# Patient Record
Sex: FEMALE | Race: WHITE | NOT HISPANIC OR LATINO | Employment: OTHER | URBAN - METROPOLITAN AREA
[De-identification: names, ages, dates, MRNs, and addresses within clinical notes are randomized per-mention and may not be internally consistent; named-entity substitution may affect disease eponyms.]

---

## 2017-01-01 ENCOUNTER — TELEPHONE (OUTPATIENT)
Dept: FAMILY MEDICINE | Facility: CLINIC | Age: 72
End: 2017-01-01

## 2017-01-01 ENCOUNTER — LAB VISIT (OUTPATIENT)
Dept: LAB | Facility: HOSPITAL | Age: 72
End: 2017-01-01
Attending: FAMILY MEDICINE
Payer: MEDICARE

## 2017-01-01 ENCOUNTER — OFFICE VISIT (OUTPATIENT)
Dept: CARDIOLOGY | Facility: CLINIC | Age: 72
End: 2017-01-01
Payer: MEDICARE

## 2017-01-01 ENCOUNTER — OFFICE VISIT (OUTPATIENT)
Dept: FAMILY MEDICINE | Facility: CLINIC | Age: 72
End: 2017-01-01
Payer: MEDICARE

## 2017-01-01 ENCOUNTER — TELEPHONE (OUTPATIENT)
Dept: CARDIOLOGY | Facility: CLINIC | Age: 72
End: 2017-01-01

## 2017-01-01 ENCOUNTER — TELEPHONE (OUTPATIENT)
Dept: PHARMACY | Facility: CLINIC | Age: 72
End: 2017-01-01

## 2017-01-01 ENCOUNTER — PATIENT OUTREACH (OUTPATIENT)
Dept: ADMINISTRATIVE | Facility: HOSPITAL | Age: 72
End: 2017-01-01

## 2017-01-01 ENCOUNTER — OFFICE VISIT (OUTPATIENT)
Dept: PRIMARY CARE CLINIC | Facility: CLINIC | Age: 72
End: 2017-01-01
Payer: MEDICARE

## 2017-01-01 ENCOUNTER — CLINICAL SUPPORT (OUTPATIENT)
Dept: CARDIOLOGY | Facility: CLINIC | Age: 72
End: 2017-01-01
Payer: MEDICARE

## 2017-01-01 VITALS
SYSTOLIC BLOOD PRESSURE: 94 MMHG | RESPIRATION RATE: 16 BRPM | HEIGHT: 65 IN | TEMPERATURE: 98 F | DIASTOLIC BLOOD PRESSURE: 64 MMHG | WEIGHT: 139.13 LBS | OXYGEN SATURATION: 97 % | BODY MASS INDEX: 23.18 KG/M2 | HEART RATE: 96 BPM

## 2017-01-01 VITALS
BODY MASS INDEX: 23.36 KG/M2 | TEMPERATURE: 99 F | DIASTOLIC BLOOD PRESSURE: 68 MMHG | HEIGHT: 65 IN | WEIGHT: 140.19 LBS | HEART RATE: 80 BPM | SYSTOLIC BLOOD PRESSURE: 100 MMHG

## 2017-01-01 VITALS
HEART RATE: 90 BPM | SYSTOLIC BLOOD PRESSURE: 106 MMHG | HEIGHT: 65 IN | OXYGEN SATURATION: 96 % | BODY MASS INDEX: 22.66 KG/M2 | WEIGHT: 136 LBS | DIASTOLIC BLOOD PRESSURE: 66 MMHG

## 2017-01-01 VITALS
DIASTOLIC BLOOD PRESSURE: 56 MMHG | BODY MASS INDEX: 24.91 KG/M2 | WEIGHT: 149.5 LBS | HEART RATE: 88 BPM | HEIGHT: 65 IN | SYSTOLIC BLOOD PRESSURE: 94 MMHG

## 2017-01-01 VITALS
HEART RATE: 91 BPM | BODY MASS INDEX: 23.88 KG/M2 | WEIGHT: 143.31 LBS | DIASTOLIC BLOOD PRESSURE: 93 MMHG | HEIGHT: 65 IN | SYSTOLIC BLOOD PRESSURE: 192 MMHG

## 2017-01-01 DIAGNOSIS — I70.0 THORACIC AORTA ATHEROSCLEROSIS: ICD-10-CM

## 2017-01-01 DIAGNOSIS — Z12.31 ENCOUNTER FOR SCREENING MAMMOGRAM FOR MALIGNANT NEOPLASM OF BREAST: ICD-10-CM

## 2017-01-01 DIAGNOSIS — I10 ESSENTIAL HYPERTENSION: ICD-10-CM

## 2017-01-01 DIAGNOSIS — Z72.0 TOBACCO ABUSE: ICD-10-CM

## 2017-01-01 DIAGNOSIS — I48.91 ATRIAL FIBRILLATION, UNSPECIFIED TYPE: Primary | ICD-10-CM

## 2017-01-01 DIAGNOSIS — Z13.6 SCREENING FOR HYPERTENSION: Primary | ICD-10-CM

## 2017-01-01 DIAGNOSIS — R10.30 LOWER ABDOMINAL PAIN: ICD-10-CM

## 2017-01-01 DIAGNOSIS — R06.2 WHEEZING: Primary | ICD-10-CM

## 2017-01-01 DIAGNOSIS — F10.10 ALCOHOL ABUSE: ICD-10-CM

## 2017-01-01 DIAGNOSIS — Z78.0 ASYMPTOMATIC POSTMENOPAUSAL STATUS (AGE-RELATED) (NATURAL): Primary | ICD-10-CM

## 2017-01-01 DIAGNOSIS — I48.91 ATRIAL FIBRILLATION, UNSPECIFIED TYPE: ICD-10-CM

## 2017-01-01 DIAGNOSIS — Z13.6 SCREENING FOR HYPERTENSION: ICD-10-CM

## 2017-01-01 DIAGNOSIS — Z87.81 S/P LEFT HIP FRACTURE: ICD-10-CM

## 2017-01-01 DIAGNOSIS — Z00.00 PREVENTATIVE HEALTH CARE: Primary | ICD-10-CM

## 2017-01-01 DIAGNOSIS — I48.0 PAROXYSMAL ATRIAL FIBRILLATION: Primary | ICD-10-CM

## 2017-01-01 DIAGNOSIS — J44.1 COPD EXACERBATION: Primary | ICD-10-CM

## 2017-01-01 DIAGNOSIS — J44.9 CHRONIC OBSTRUCTIVE PULMONARY DISEASE, UNSPECIFIED COPD TYPE: ICD-10-CM

## 2017-01-01 DIAGNOSIS — J44.89 ASTHMA-CHRONIC OBSTRUCTIVE PULMONARY DISEASE OVERLAP SYNDROME: ICD-10-CM

## 2017-01-01 DIAGNOSIS — R05.9 COUGH: ICD-10-CM

## 2017-01-01 LAB
BILIRUB UR QL STRIP: NEGATIVE
CHOLEST SERPL-MCNC: 208 MG/DL
CHOLEST/HDLC SERPL: 3.1 {RATIO}
CLARITY UR: CLEAR
COLOR UR: YELLOW
GLUCOSE UR QL STRIP: NEGATIVE
HDLC SERPL-MCNC: 68 MG/DL
HDLC SERPL: 32.7 %
HGB UR QL STRIP: ABNORMAL
KETONES UR QL STRIP: NEGATIVE
LDLC SERPL CALC-MCNC: 119.4 MG/DL
LEUKOCYTE ESTERASE UR QL STRIP: NEGATIVE
NITRITE UR QL STRIP: NEGATIVE
NONHDLC SERPL-MCNC: 140 MG/DL
PH UR STRIP: 6 [PH] (ref 5–8)
PROT UR QL STRIP: NEGATIVE
SP GR UR STRIP: >=1.03 (ref 1–1.03)
TRIGL SERPL-MCNC: 103 MG/DL
URN SPEC COLLECT METH UR: ABNORMAL

## 2017-01-01 PROCEDURE — 99999 PR PBB SHADOW E&M-EST. PATIENT-LVL II: CPT | Mod: PBBFAC,,, | Performed by: INTERNAL MEDICINE

## 2017-01-01 PROCEDURE — 3078F DIAST BP <80 MM HG: CPT | Mod: S$GLB,,, | Performed by: NURSE PRACTITIONER

## 2017-01-01 PROCEDURE — 1159F MED LIST DOCD IN RCRD: CPT | Mod: S$GLB,,, | Performed by: INTERNAL MEDICINE

## 2017-01-01 PROCEDURE — 1125F AMNT PAIN NOTED PAIN PRSNT: CPT | Mod: S$GLB,,, | Performed by: NURSE PRACTITIONER

## 2017-01-01 PROCEDURE — 99499 UNLISTED E&M SERVICE: CPT | Mod: S$GLB,,, | Performed by: INTERNAL MEDICINE

## 2017-01-01 PROCEDURE — 99204 OFFICE O/P NEW MOD 45 MIN: CPT | Mod: S$GLB,,, | Performed by: INTERNAL MEDICINE

## 2017-01-01 PROCEDURE — 80061 LIPID PANEL: CPT

## 2017-01-01 PROCEDURE — 99214 OFFICE O/P EST MOD 30 MIN: CPT | Mod: S$GLB,,, | Performed by: NURSE PRACTITIONER

## 2017-01-01 PROCEDURE — 1160F RVW MEDS BY RX/DR IN RCRD: CPT | Mod: S$GLB,,, | Performed by: NURSE PRACTITIONER

## 2017-01-01 PROCEDURE — 99499 UNLISTED E&M SERVICE: CPT | Mod: S$GLB,,, | Performed by: NURSE PRACTITIONER

## 2017-01-01 PROCEDURE — 99214 OFFICE O/P EST MOD 30 MIN: CPT | Mod: S$GLB,,, | Performed by: INTERNAL MEDICINE

## 2017-01-01 PROCEDURE — 3008F BODY MASS INDEX DOCD: CPT | Mod: S$GLB,,, | Performed by: NURSE PRACTITIONER

## 2017-01-01 PROCEDURE — 81003 URINALYSIS AUTO W/O SCOPE: CPT | Mod: PO

## 2017-01-01 PROCEDURE — 99999 PR PBB SHADOW E&M-EST. PATIENT-LVL IV: CPT | Mod: PBBFAC,,, | Performed by: NURSE PRACTITIONER

## 2017-01-01 PROCEDURE — 99204 OFFICE O/P NEW MOD 45 MIN: CPT | Mod: S$GLB,,, | Performed by: NURSE PRACTITIONER

## 2017-01-01 PROCEDURE — G0009 ADMIN PNEUMOCOCCAL VACCINE: HCPCS | Mod: S$GLB,,, | Performed by: NURSE PRACTITIONER

## 2017-01-01 PROCEDURE — 99999 PR PBB SHADOW E&M-EST. PATIENT-LVL V: CPT | Mod: PBBFAC,,, | Performed by: NURSE PRACTITIONER

## 2017-01-01 PROCEDURE — 1159F MED LIST DOCD IN RCRD: CPT | Mod: S$GLB,,, | Performed by: NURSE PRACTITIONER

## 2017-01-01 PROCEDURE — 99213 OFFICE O/P EST LOW 20 MIN: CPT | Mod: S$GLB,,, | Performed by: NURSE PRACTITIONER

## 2017-01-01 PROCEDURE — 36415 COLL VENOUS BLD VENIPUNCTURE: CPT | Mod: PO

## 2017-01-01 PROCEDURE — 90670 PCV13 VACCINE IM: CPT | Mod: S$GLB,,, | Performed by: NURSE PRACTITIONER

## 2017-01-01 PROCEDURE — 93224 XTRNL ECG REC UP TO 48 HRS: CPT | Mod: S$GLB,,, | Performed by: INTERNAL MEDICINE

## 2017-01-01 PROCEDURE — 3074F SYST BP LT 130 MM HG: CPT | Mod: S$GLB,,, | Performed by: NURSE PRACTITIONER

## 2017-01-01 PROCEDURE — 1126F AMNT PAIN NOTED NONE PRSNT: CPT | Mod: S$GLB,,, | Performed by: INTERNAL MEDICINE

## 2017-01-01 RX ORDER — VALSARTAN 320 MG/1
TABLET ORAL
OUTPATIENT
Start: 2017-01-01

## 2017-01-01 RX ORDER — DRONEDARONE 400 MG/1
TABLET, FILM COATED ORAL
COMMUNITY
Start: 2017-01-01 | End: 2017-01-01 | Stop reason: ALTCHOICE

## 2017-01-01 RX ORDER — TIOTROPIUM BROMIDE 18 UG/1
CAPSULE ORAL; RESPIRATORY (INHALATION)
Qty: 30 CAPSULE | Refills: 11 | Status: SHIPPED | OUTPATIENT
Start: 2017-01-01

## 2017-01-01 RX ORDER — DOXYCYCLINE 100 MG/1
100 CAPSULE ORAL 2 TIMES DAILY
Qty: 20 CAPSULE | Refills: 0 | Status: SHIPPED | OUTPATIENT
Start: 2017-01-01 | End: 2017-01-01

## 2017-01-01 RX ORDER — FLUTICASONE PROPIONATE 50 MCG
2 SPRAY, SUSPENSION (ML) NASAL DAILY
Qty: 16 G | Refills: 11 | Status: SHIPPED | OUTPATIENT
Start: 2017-01-01

## 2017-01-01 RX ORDER — ALBUTEROL SULFATE 90 UG/1
2 AEROSOL, METERED RESPIRATORY (INHALATION) EVERY 6 HOURS PRN
Qty: 18 G | Refills: 3 | Status: SHIPPED | OUTPATIENT
Start: 2017-01-01

## 2017-01-01 RX ORDER — IPRATROPIUM BROMIDE 0.5 MG/2.5ML
500 SOLUTION RESPIRATORY (INHALATION) 4 TIMES DAILY
Status: ON HOLD | COMMUNITY
End: 2017-01-01 | Stop reason: HOSPADM

## 2017-01-01 RX ORDER — PREDNISONE 20 MG/1
TABLET ORAL
Qty: 20 TABLET | Refills: 0 | Status: SHIPPED | OUTPATIENT
Start: 2017-01-01 | End: 2017-01-01

## 2017-01-01 RX ORDER — METHYLPREDNISOLONE 4 MG/1
TABLET ORAL
COMMUNITY
Start: 2017-01-01 | End: 2017-01-01

## 2017-01-01 RX ORDER — VALSARTAN 320 MG/1
TABLET ORAL
COMMUNITY
Start: 2017-01-01 | End: 2017-01-01 | Stop reason: SDUPTHER

## 2017-01-01 RX ORDER — OMEPRAZOLE 20 MG/1
20 CAPSULE, DELAYED RELEASE ORAL DAILY
COMMUNITY
End: 2017-01-01 | Stop reason: CLARIF

## 2017-01-01 RX ORDER — PREDNISONE 10 MG/1
TABLET ORAL
Qty: 20 TABLET | Refills: 0 | Status: SHIPPED | OUTPATIENT
Start: 2017-01-01 | End: 2017-01-01

## 2017-01-01 RX ORDER — ALBUTEROL SULFATE 0.83 MG/ML
SOLUTION RESPIRATORY (INHALATION)
Qty: 300 ML | Refills: 0 | Status: SHIPPED | OUTPATIENT
Start: 2017-01-01 | End: 2017-01-01 | Stop reason: SDUPTHER

## 2017-01-01 RX ORDER — HYDROCODONE BITARTRATE AND ACETAMINOPHEN 5; 325 MG/1; MG/1
1 TABLET ORAL EVERY 6 HOURS PRN
OUTPATIENT
Start: 2017-01-01

## 2017-01-01 RX ORDER — VALSARTAN 320 MG/1
320 TABLET ORAL DAILY
Qty: 30 TABLET | Refills: 5 | Status: SHIPPED | OUTPATIENT
Start: 2017-01-01 | End: 2017-01-01 | Stop reason: CLARIF

## 2017-01-01 RX ORDER — LIDOCAINE AND PRILOCAINE 25; 25 MG/G; MG/G
CREAM TOPICAL
COMMUNITY
Start: 2017-01-01 | End: 2017-01-01

## 2017-01-01 RX ORDER — ALBUTEROL SULFATE 0.83 MG/ML
SOLUTION RESPIRATORY (INHALATION)
Qty: 300 ML | Refills: 0 | Status: SHIPPED | OUTPATIENT
Start: 2017-01-01 | End: 2018-01-01 | Stop reason: SDUPTHER

## 2017-01-01 RX ORDER — FERROUS SULFATE 220 (44)/5
220 SOLUTION, ORAL ORAL DAILY
COMMUNITY
End: 2017-01-01

## 2017-01-01 RX ORDER — VALSARTAN AND HYDROCHLOROTHIAZIDE 320; 12.5 MG/1; MG/1
1 TABLET, FILM COATED ORAL DAILY
COMMUNITY
End: 2017-01-01

## 2017-01-01 RX ORDER — PNV NO.95/FERROUS FUM/FOLIC AC 28MG-0.8MG
100 TABLET ORAL DAILY
COMMUNITY

## 2017-01-01 RX ORDER — AZITHROMYCIN 250 MG/1
TABLET, FILM COATED ORAL
COMMUNITY
Start: 2017-01-01 | End: 2017-01-01 | Stop reason: ALTCHOICE

## 2017-01-01 RX ORDER — VALSARTAN AND HYDROCHLOROTHIAZIDE 160; 12.5 MG/1; MG/1
1 TABLET, FILM COATED ORAL DAILY
Qty: 90 TABLET | Refills: 3 | Status: ON HOLD | OUTPATIENT
Start: 2017-01-01 | End: 2018-01-01 | Stop reason: HOSPADM

## 2017-01-01 RX ORDER — PREDNISONE 10 MG/1
TABLET ORAL
COMMUNITY
Start: 2017-01-01 | End: 2017-01-01

## 2017-01-01 RX ORDER — AZELASTINE 1 MG/ML
1 SPRAY, METERED NASAL 2 TIMES DAILY
COMMUNITY

## 2017-01-01 RX ORDER — IBUPROFEN 200 MG
200 TABLET ORAL EVERY 6 HOURS PRN
Status: ON HOLD | COMMUNITY
End: 2018-01-01 | Stop reason: HOSPADM

## 2017-01-04 ENCOUNTER — OFFICE VISIT (OUTPATIENT)
Dept: PRIMARY CARE CLINIC | Facility: CLINIC | Age: 72
End: 2017-01-04
Payer: MEDICARE

## 2017-01-04 VITALS
DIASTOLIC BLOOD PRESSURE: 73 MMHG | TEMPERATURE: 98 F | OXYGEN SATURATION: 94 % | HEIGHT: 65 IN | WEIGHT: 138.69 LBS | SYSTOLIC BLOOD PRESSURE: 113 MMHG | HEART RATE: 101 BPM | BODY MASS INDEX: 23.11 KG/M2

## 2017-01-04 DIAGNOSIS — J44.0 CHRONIC OBSTRUCTIVE PULMONARY DISEASE WITH ACUTE LOWER RESPIRATORY INFECTION: Primary | ICD-10-CM

## 2017-01-04 DIAGNOSIS — R06.2 WHEEZING: ICD-10-CM

## 2017-01-04 DIAGNOSIS — R05.9 COUGH: ICD-10-CM

## 2017-01-04 PROCEDURE — 1157F ADVNC CARE PLAN IN RCRD: CPT | Mod: S$GLB,,, | Performed by: NURSE PRACTITIONER

## 2017-01-04 PROCEDURE — 99999 PR PBB SHADOW E&M-EST. PATIENT-LVL IV: CPT | Mod: PBBFAC,,, | Performed by: NURSE PRACTITIONER

## 2017-01-04 PROCEDURE — 99213 OFFICE O/P EST LOW 20 MIN: CPT | Mod: S$GLB,,, | Performed by: NURSE PRACTITIONER

## 2017-01-04 PROCEDURE — 99499 UNLISTED E&M SERVICE: CPT | Mod: S$GLB,,, | Performed by: NURSE PRACTITIONER

## 2017-01-04 PROCEDURE — 3074F SYST BP LT 130 MM HG: CPT | Mod: S$GLB,,, | Performed by: NURSE PRACTITIONER

## 2017-01-04 PROCEDURE — 1160F RVW MEDS BY RX/DR IN RCRD: CPT | Mod: S$GLB,,, | Performed by: NURSE PRACTITIONER

## 2017-01-04 PROCEDURE — 1159F MED LIST DOCD IN RCRD: CPT | Mod: S$GLB,,, | Performed by: NURSE PRACTITIONER

## 2017-01-04 PROCEDURE — 3078F DIAST BP <80 MM HG: CPT | Mod: S$GLB,,, | Performed by: NURSE PRACTITIONER

## 2017-01-04 PROCEDURE — 1126F AMNT PAIN NOTED NONE PRSNT: CPT | Mod: S$GLB,,, | Performed by: NURSE PRACTITIONER

## 2017-01-04 RX ORDER — PROMETHAZINE HYDROCHLORIDE AND DEXTROMETHORPHAN HYDROBROMIDE 6.25; 15 MG/5ML; MG/5ML
5 SYRUP ORAL NIGHTLY PRN
Qty: 180 ML | Refills: 0 | Status: SHIPPED | OUTPATIENT
Start: 2017-01-04 | End: 2017-01-14

## 2017-01-04 RX ORDER — PREDNISONE 10 MG/1
TABLET ORAL
Qty: 30 TABLET | Refills: 0 | Status: SHIPPED | OUTPATIENT
Start: 2017-01-04 | End: 2017-01-01

## 2017-01-04 NOTE — PATIENT INSTRUCTIONS
Since you don't have colored mucus at this time, we'll use prednisone and cough syrup.    Mucinex DM to help loosen the mucus + water    Use spacer when you use the Albuterol inhaler.  Use albuterol (treatment and/or inhaler) 4 times a day until improved.    Continue Advair, Spiriva.    If you are using an spacer with the inhaler:  Shake the inhaler.  Connect it to the spacer.  Put the spacer to your lips.  Spray the medication into the spacer.  Slowly inhale the medication from the spacer.  Hold to the count of 10, if you can.  Breathe out.  Rinse your mouth out with water and spit it in the sink.  Wait 1-2 minutes.  Repeat.    If you have any questions, please call.  You can reach us at [phone] Monday through Friday (except holidays) 12 nooon to 6 p.m.    Thank you for using the [facility]!    ALEX Romo, CNP, FNP-BC  [facility]  [facility]

## 2017-01-04 NOTE — MR AVS SNAPSHOT
[facility]  [address] [facility] [address] [place] [address]  Phone: [phone]                  Waleska Weaver   2017 11:00 AM   Office Visit    Description:  Female : 1945   Provider:  Ana Arevalo NP   Department:  [facility]           Reason for Visit     Cough     Headache     Nasal Congestion     Chest Congestion           Diagnoses this Visit        Comments    Chronic obstructive pulmonary disease with acute lower respiratory infection    -  Primary     Wheezing         Cough                To Do List           Goals (5 Years of Data)     None       These Medications        Disp Refills Start End    inhalation device (E-Z SPACER) 1 Device 0 2017     Use as directed for inhalation.    Pharmacy: [facility] [address] Ph #: [phone]       predniSONE (DELTASONE) 10 MG tablet 30 tablet 0 2017     4 tabs daily for 3 days, 3 tabs daily for 3 days, 2 tabs daily for 3 days and 1 tab daily for 3 days    Pharmacy: [facility] [address] Ph #: [phone]       promethazine-dextromethorphan (PROMETHAZINE-DM) 6.25-15 mg/5 mL Syrp 180 mL 0 2017    Take 5 mLs by mouth nightly as needed. - Oral    Pharmacy: [facility] [place] - [place] -  [address] Ph #: [phone]         [facility]     [facility] Care [facility] -  Assistance  Registered nurses in the [facility] provide clinical advisement, health education, appointment booking, and other advisory services.  Call for this free service at [phone].             Medications           Message regarding Medications     Verify the changes and/or additions to your medication regime listed below are the same as discussed with your clinician today.  If any of these changes or additions are incorrect, please notify your healthcare provider.        START taking these  NEW medications        Refills    inhalation device (E-Z SPACER) 0    Sig: Use as directed for inhalation.    Class: Print    predniSONE (DELTASONE) 10 MG tablet 0    Si tabs daily for 3 days, 3 tabs daily for 3 days, 2 tabs daily for 3 days and 1 tab daily for 3 days    Class: Normal    promethazine-dextromethorphan (PROMETHAZINE-DM) 6.25-15 mg/5 mL Syrp 0    Sig: Take 5 mLs by mouth nightly as needed.    Class: Normal    Route: Oral      STOP taking these medications     methylPREDNISolone (MEDROL DOSEPACK) 4 mg tablet Take as directed           Verify that the below list of medications is an accurate representation of the medications you are currently taking.  If none reported, the list may be blank. If incorrect, please contact your healthcare provider. Carry this list with you in case of emergency.           Current Medications     azelastine (ASTELIN) 137 mcg (0.1 %) nasal spray 1 SPRAY NASALLY TWICE A DAY AS NEEDED    dextromethorphan-guaifenesin  mg (MUCINEX DM)  mg per 12 hr tablet Take 1 tablet by mouth every 12 (twelve) hours.    diclofenac 1.3 % PT12 Use patch on affected area q 12 hours prn    fluticasone-salmeterol 500-50 mcg/dose (ADVAIR DISKUS) 500-50 mcg/dose DsDv diskus inhaler Inhale 1 puff into the lungs 2 (two) times daily.    gabapentin (NEURONTIN) 300 MG capsule Take 1 capsule (300 mg total) by mouth 3 (three) times daily.    guaifenesin (MUCINEX) 600 mg 12 hr tablet Take 1,200 mg by mouth 2 (two) times daily.    hydrocodone-acetaminophen 5-325mg (NORCO) 5-325 mg per tablet     ibuprofen (ADVIL,MOTRIN) 200 MG tablet Take 400 mg by mouth every 8 (eight) hours as needed.    lidocaine (XYLOCAINE) 5 % Oint ointment Apply topically as needed.    lidocaine-prilocaine (EMLA) cream     omeprazole (PRILOSEC) 20 MG capsule     tiotropium (SPIRIVA) 18 mcg inhalation capsule Inhale 1 capsule (18 mcg total) into the lungs once daily.    valsartan-hydrochlorothiazide (DIOVAN-HCT) 320-12.5 mg  "per tablet TAKE ONE TABLET BY MOUTH EVERY DAY    VENTOLIN HFA 90 mcg/actuation inhaler INHALE 2 PUFFS BY MOUTH EVERY 6 HOURS AS NEEDED FOR WHEEZING    acyclovir (ZOVIRAX) 800 MG Tab Take 1 tablet (800 mg total) by mouth 4 (four) times daily.    inhalation device (E-Z SPACER) Use as directed for inhalation.    predniSONE (DELTASONE) 10 MG tablet 4 tabs daily for 3 days, 3 tabs daily for 3 days, 2 tabs daily for 3 days and 1 tab daily for 3 days    promethazine-dextromethorphan (PROMETHAZINE-DM) 6.25-15 mg/5 mL Syrp Take 5 mLs by mouth nightly as needed.           Clinical Reference Information           Vital Signs - Last Recorded  Most recent update: 1/4/2017 11:13 AM by Dona Romero LPN    BP Pulse Temp Ht Wt SpO2    113/73 (BP Location: Left arm, Patient Position: Sitting, BP Method: Automatic) 101 97.6 °F (36.4 °C) (Oral) 5' 5" (1.651 m) 62.9 kg (138 lb 10.7 oz) (!) 94%    BMI                23.08 kg/m2          Blood Pressure          Most Recent Value    BP  113/73      Allergies as of 1/4/2017     Levaquin [Levofloxacin]    Penicillins    Sulfa (Sulfonamide Antibiotics)    Tramadol      Immunizations Administered on Date of Encounter - 1/4/2017     None      [facility] Sign-Up     Activating your [facility] account is as easy as 1-2-3!     1) Visit [email or web address], select Sign Up Now, enter this activation code and your date of birth, then select Next.  [email or web address]  Expires: 2/18/2017 11:52 AM      2) Create a username and password to use when you visit [facility] in the future and select a security question in case you lose your password and select Next.    3) Enter your e-mail address and click Sign Up!    Additional Information  If you have questions, please e-mail [email or web address] or call [phone] to talk to our [facility] staff. Remember, [facility] is NOT to be used for urgent needs. For medical emergencies, dial 911.         Instructions    Since you don't have colored mucus at this " time, we'll use prednisone and cough syrup.    Mucinex DM to help loosen the mucus + water    Use spacer when you use the Albuterol inhaler.  Use albuterol (treatment and/or inhaler) 4 times a day until improved.    Continue Advair, Spiriva.    If you are using an spacer with the inhaler:  Shake the inhaler.  Connect it to the spacer.  Put the spacer to your lips.  Spray the medication into the spacer.  Slowly inhale the medication from the spacer.  Hold to the count of 10, if you can.  Breathe out.  Rinse your mouth out with water and spit it in the sink.  Wait 1-2 minutes.  Repeat.    If you have any questions, please call.  You can reach us at [phone] Monday through Friday (except holidays) 12 nooon to 6 p.m.    Thank you for using the [facility]!    ALEX Romo, CNP, FNP-BC  [facility]  [facility]           Smoking Cessation     If you would like to quit smoking:   You may be eligible for free services if you are a [place] resident and started smoking cigarettes before September 1, 1988.  Call the [employer]oking [employer]ess[employer] (S[employer]) toll free at [phone] or [phone].   Call 1-008-QUIT-NOW if you do not meet the above criteria.

## 2017-01-04 NOTE — PROGRESS NOTES
"Waleska Weaver is a 71 y.o. female patient of Danielito Medley MD who presents to the clinic today for   Chief Complaint   Patient presents with    Cough     past month on and off    Headache    Nasal Congestion    Chest Congestion   .    HPI    Pt, who is not known to me, reports a new problem to me, as follows:  Continued coughing.  Coughing up a lot of mucus (clear to yellowish), nasal congestion clear.  Blowing bubbles with the nasal secretions.  No fever.  Having body aches.  Had 1 episode of nausea this morning.  The last few nights coughing has impeded her sleep.  Once she expectorates the mucus, she feels better.  Drinking "Cokes".    Treated with doxycycline, benzonatate--didn't improve.  Using the Advair twice a day, spiriva once a day and treatments twice day, inhaler twice a day.   Continues to smoke.    These symptoms began 1 month or more ago and status is continuing.     Symptoms are + acute exac of chronic COPD.    Pt denies the following symptoms:  fever    Aggravating factors include nothing .    Relieving factors include nothing except coughing up the mucus .    OTC Medications tried are nothing.    Prescription medications taken for symptoms are Albuterol, spiriva, Advair.    Pertinent medical history:  Asthma/COPD.    Smoking status:  Current smoker    ROS    Constitutional:   No  fever, + fatigue, decrease in appetite.      Head:    No headache    EENT:  Ears:    No pain  Eyes:    No discharge, no irritation, no change in vision  Nose:    No sinus pain, + congestion, + runny nose, + drip.  Throat:  no ST pain, no difficulty swallowing.  Has been getting soreness in her mouth when she uses the Ventolin, which she places in her mouth to use.               Heart:  No palpitations, chest pain.    Lungs:  + difficulty breathing, + coughing, + sputum production, + wheezing.  Has improved in the past with prednisone              Symptoms are community acquired.  "whole family" sick    GI/:  " No sxs    MS:  No new bone, joint or muscle problems.    Skin:  No rashes, itching.    ALLERGIES AND MEDICATIONS: updated and reviewed.  Review of patient's allergies indicates:   Allergen Reactions    Levaquin [levofloxacin] Itching    Penicillins Swelling     Tongue and lip swelling    Sulfa (sulfonamide antibiotics) Swelling     Tongue and lip swelling    Tramadol      Current Outpatient Prescriptions   Medication Sig Dispense Refill    azelastine (ASTELIN) 137 mcg (0.1 %) nasal spray 1 SPRAY NASALLY TWICE A DAY AS NEEDED 30 mL 3    dextromethorphan-guaifenesin  mg (MUCINEX DM)  mg per 12 hr tablet Take 1 tablet by mouth every 12 (twelve) hours.      diclofenac 1.3 % PT12 Use patch on affected area q 12 hours prn 30 patch 11    fluticasone-salmeterol 500-50 mcg/dose (ADVAIR DISKUS) 500-50 mcg/dose DsDv diskus inhaler Inhale 1 puff into the lungs 2 (two) times daily. 3 Inhaler 4    gabapentin (NEURONTIN) 300 MG capsule Take 1 capsule (300 mg total) by mouth 3 (three) times daily. 90 capsule 11    guaifenesin (MUCINEX) 600 mg 12 hr tablet Take 1,200 mg by mouth 2 (two) times daily.      hydrocodone-acetaminophen 5-325mg (NORCO) 5-325 mg per tablet       ibuprofen (ADVIL,MOTRIN) 200 MG tablet Take 400 mg by mouth every 8 (eight) hours as needed.      lidocaine (XYLOCAINE) 5 % Oint ointment Apply topically as needed. 30 g 5    lidocaine-prilocaine (EMLA) cream       methylPREDNISolone (MEDROL DOSEPACK) 4 mg tablet Take as directed 21 tablet 0    omeprazole (PRILOSEC) 20 MG capsule       tiotropium (SPIRIVA) 18 mcg inhalation capsule Inhale 1 capsule (18 mcg total) into the lungs once daily. 30 capsule 6    valsartan-hydrochlorothiazide (DIOVAN-HCT) 320-12.5 mg per tablet TAKE ONE TABLET BY MOUTH EVERY DAY 90 tablet 3    VENTOLIN HFA 90 mcg/actuation inhaler INHALE 2 PUFFS BY MOUTH EVERY 6 HOURS AS NEEDED FOR WHEEZING 18 g 3    acyclovir (ZOVIRAX) 800 MG Tab Take 1 tablet (800 mg total)  "by mouth 4 (four) times daily. 40 tablet 0     No current facility-administered medications for this visit.              PHYSICAL EXAM    Alert, coop 71 y.o. female patient in no acute distress.    Vitals:    01/04/17 1110   BP: 113/73   BP Location: Left arm   Patient Position: Sitting   BP Method: Automatic   Pulse: 101   Temp: 97.6 °F (36.4 °C)   TempSrc: Oral   SpO2: (!) 94%   Weight: 62.9 kg (138 lb 10.7 oz)   Height: 5' 5" (1.651 m)     VS reviewed.  VS pulse elevated.  CC, nursing note, medications, PMH, PSH, FH and Soc Hx all reviewed today.    Head:  Normocephalic, atraumatic.    EENT:  EACs patent.  TMs no erythema, diffuse LR, no effusions, no TM perforation.               Eye lids normal, no discharge present.       No Sinus tenderness to palp.               Nares--no edema, no d/c present.    Pharynx not injected.                Tonsils absent    No anterior, no posterior cervical lymph nodes palpable.    No submental, submandibular, preauricular or supraclavicular lymph nodes palp.             Resp:  Respirations even, unlabored   Lungs CTA bilat.except for light wheezing.  No crackles.  Moves air to bases bilat.    Heart:  RRR, no MRG.    MS:  Ambulates normally.             NEURO:  Alert and oriented x 4.  Responds appropriately during interaction.    Skin:  Warm, dry, color good.    Psych:  Responds appropriately throughout the visit.               Relaxed.  Well-groomed.    Chronic obstructive pulmonary disease with acute lower respiratory infection  -     inhalation device (E-Z SPACER); Use as directed for inhalation.  Dispense: 1 Device; Refill: 0  -     predniSONE (DELTASONE) 10 MG tablet; 4 tabs daily for 3 days, 3 tabs daily for 3 days, 2 tabs daily for 3 days and 1 tab daily for 3 days  Dispense: 30 tablet; Refill: 0    Wheezing  -     predniSONE (DELTASONE) 10 MG tablet; 4 tabs daily for 3 days, 3 tabs daily for 3 days, 2 tabs daily for 3 days and 1 tab daily for 3 days  Dispense: 30 tablet; " Refill: 0    Cough  -     promethazine-dextromethorphan (PROMETHAZINE-DM) 6.25-15 mg/5 mL Syrp; Take 5 mLs by mouth nightly as needed.  Dispense: 180 mL; Refill: 0      Pt today presents with COPD exac that has been ongoing for about a month. Has had AB and benzonatate as treatment (about 1 month ago) with little improvement.  Does not report colored mucus at this time, nor fever.  She continues to smoke.  Has not been drinking much water and reports the benzonatate does not help.    This is a new problem to me.  No work up is planned.        Prednisone and cough syrup Rx.  Pt to report any change in sxs/mucus.  We discussed at length how to use the inhaler best, Mucinex and how it can help, other comfort measures  Pt advised to perform comfort measures recommended on patient instruction sheet .    If worse or concerns, the patient is advised to call us.  Explained exam findings, diagnosis and treatment plan to patient and her family member, with her during the visit.  Questions answered and patient states understanding.

## 2017-01-05 ENCOUNTER — TELEPHONE (OUTPATIENT)
Dept: PHARMACY | Facility: CLINIC | Age: 72
End: 2017-01-05

## 2017-02-14 ENCOUNTER — LAB VISIT (OUTPATIENT)
Dept: LAB | Facility: HOSPITAL | Age: 72
End: 2017-02-14
Attending: PSYCHIATRY & NEUROLOGY
Payer: MEDICARE

## 2017-02-14 DIAGNOSIS — I10 ESSENTIAL HYPERTENSION, MALIGNANT: ICD-10-CM

## 2017-02-14 DIAGNOSIS — E53.8 VITAMIN B 12 DEFICIENCY: ICD-10-CM

## 2017-02-14 DIAGNOSIS — Z13.21 ENCOUNTER FOR VITAMIN DEFICIENCY SCREENING: ICD-10-CM

## 2017-02-14 DIAGNOSIS — M79.662 BILATERAL CALF PAIN: Primary | ICD-10-CM

## 2017-02-14 DIAGNOSIS — M25.50 MULTIPLE JOINT PAIN: ICD-10-CM

## 2017-02-14 DIAGNOSIS — M79.661 BILATERAL CALF PAIN: Primary | ICD-10-CM

## 2017-02-14 LAB
ALBUMIN SERPL BCP-MCNC: 3.9 G/DL
ALP SERPL-CCNC: 71 U/L
ALT SERPL W/O P-5'-P-CCNC: 18 U/L
ANION GAP SERPL CALC-SCNC: 12 MMOL/L
AST SERPL-CCNC: 26 U/L
BASOPHILS # BLD AUTO: 0.09 K/UL
BASOPHILS NFR BLD: 1.3 %
BILIRUB SERPL-MCNC: 0.7 MG/DL
BUN SERPL-MCNC: 20 MG/DL
CALCIUM SERPL-MCNC: 9.7 MG/DL
CHLORIDE SERPL-SCNC: 97 MMOL/L
CO2 SERPL-SCNC: 25 MMOL/L
CREAT SERPL-MCNC: 1 MG/DL
DIFFERENTIAL METHOD: ABNORMAL
EOSINOPHIL # BLD AUTO: 0.1 K/UL
EOSINOPHIL NFR BLD: 1.8 %
ERYTHROCYTE [DISTWIDTH] IN BLOOD BY AUTOMATED COUNT: 12.9 %
EST. GFR  (AFRICAN AMERICAN): >60 ML/MIN/1.73 M^2
EST. GFR  (NON AFRICAN AMERICAN): 57.2 ML/MIN/1.73 M^2
FOLATE SERPL-MCNC: 10.8 NG/ML
GLUCOSE SERPL-MCNC: 88 MG/DL
HCT VFR BLD AUTO: 41.7 %
HGB BLD-MCNC: 13.8 G/DL
LYMPHOCYTES # BLD AUTO: 2.7 K/UL
LYMPHOCYTES NFR BLD: 40 %
MCH RBC QN AUTO: 32.5 PG
MCHC RBC AUTO-ENTMCNC: 33.1 %
MCV RBC AUTO: 98 FL
MONOCYTES # BLD AUTO: 0.5 K/UL
MONOCYTES NFR BLD: 6.7 %
NEUTROPHILS # BLD AUTO: 3.4 K/UL
NEUTROPHILS NFR BLD: 50.1 %
PLATELET # BLD AUTO: 273 K/UL
PMV BLD AUTO: 9.6 FL
POTASSIUM SERPL-SCNC: 4.4 MMOL/L
PROT SERPL-MCNC: 7.4 G/DL
RBC # BLD AUTO: 4.24 M/UL
SODIUM SERPL-SCNC: 134 MMOL/L
T4 FREE SERPL-MCNC: 0.86 NG/DL
TSH SERPL DL<=0.005 MIU/L-ACNC: 0.77 UIU/ML
VIT B12 SERPL-MCNC: 495 PG/ML
WBC # BLD AUTO: 6.75 K/UL

## 2017-02-14 PROCEDURE — 85025 COMPLETE CBC W/AUTO DIFF WBC: CPT

## 2017-02-14 PROCEDURE — 80053 COMPREHEN METABOLIC PANEL: CPT

## 2017-02-14 PROCEDURE — 36415 COLL VENOUS BLD VENIPUNCTURE: CPT | Mod: PO

## 2017-02-14 PROCEDURE — 84443 ASSAY THYROID STIM HORMONE: CPT

## 2017-02-14 PROCEDURE — 82746 ASSAY OF FOLIC ACID SERUM: CPT

## 2017-02-14 PROCEDURE — 84439 ASSAY OF FREE THYROXINE: CPT

## 2017-02-14 PROCEDURE — 82607 VITAMIN B-12: CPT

## 2017-02-14 PROCEDURE — 83036 HEMOGLOBIN GLYCOSYLATED A1C: CPT

## 2017-02-15 LAB
ESTIMATED AVG GLUCOSE: 111 MG/DL
HBA1C MFR BLD HPLC: 5.5 %

## 2017-04-20 NOTE — TELEPHONE ENCOUNTER
Called pt, she was at [facility] ER and needs follow up. She wants to see Renate Ramos NP. She stated she is feeling better and can wait till next week. Scheduled pt and she verbally understood.

## 2017-04-20 NOTE — TELEPHONE ENCOUNTER
----- Message from Rocio Park sent at 4/20/2017 10:09 AM CDT -----  Contact: self  Patient [phone] was at [facility] yesterday for congestion and she was advised to followup with Rachelle Ramos or Dr Medley tomorrow 04 21 17/please advise as Ms Ramos is out until next week

## 2017-04-26 NOTE — MR AVS SNAPSHOT
[facility]  [address] [facility] [address]  [place] [address]  Phone: [phone]  Fax: [phone]                  Waleska Weaver   2017 10:20 AM   Office Visit    Description:  Female : 1945   Provider:  Renate Ramos NP   Department:  [facility]           Reason for Visit     Hospital Follow Up           Diagnoses this Visit        Comments    Preventative health care    -  Primary     Screening for breast cancer         Encounter for other screening for malignant neoplasm of breast         Encounter for screening mammogram for malignant neoplasm of breast                To Do List           Goals (5 Years of Data)     None      [facility]     [facility]  Assistance  Unless otherwise directed by your provider, please contact [facility], our nurse care line that is available for  assistance.     Registered nurses in the [facility] provide: appointment scheduling, clinical advisement, health education, and other advisory services.  Call: [phone] (toll free)               Medications           Message regarding Medications     Verify the changes and/or additions to your medication regime listed below are the same as discussed with your clinician today.  If any of these changes or additions are incorrect, please notify your healthcare provider.        STOP taking these medications     azithromycin (Z-JESS) 250 MG tablet            Verify that the below list of medications is an accurate representation of the medications you are currently taking.  If none reported, the list may be blank. If incorrect, please contact your healthcare provider. Carry this list with you in case of emergency.           Current Medications     acetaminophen (TYLENOL) 325 MG tablet Take 325 mg by mouth every 6 (six) hours as needed for Pain. 2 tablets every 6 hours prn pain/increase temp    albuterol (PROVENTIL) 2.5 mg /3 mL (0.083  "%) nebulizer solution Take 2.5 mg by nebulization 4 (four) times daily. Rescue    digoxin (LANOXIN) 125 mcg tablet Take 125 mcg by mouth once daily.    diphenhydrAMINE (BENADRYL) 2 % cream Apply topically 3 (three) times daily as needed for Itching.    docusate sodium (COLACE) 50 MG capsule Take by mouth 2 (two) times daily.    doxycycline (VIBRAMYCIN) 100 MG Cap Take 1 capsule (100 mg total) by mouth 2 (two) times daily.    ferrous sulfate 220 mg (44 mg iron)/5 mL solution Take 220 mg by mouth once daily.    fluticasone-salmeterol 500-50 mcg/dose (ADVAIR DISKUS) 500-50 mcg/dose DsDv diskus inhaler Inhale 1 puff into the lungs 2 (two) times daily.    gabapentin (NEURONTIN) 300 MG capsule Take 1 capsule (300 mg total) by mouth 3 (three) times daily.    hydrocodone-acetaminophen 5-325mg (NORCO) 5-325 mg per tablet 1 tablet every 6 (six) hours as needed.     nicotine (NICODERM CQ) 21 mg/24 hr Place 1 patch onto the skin every 72 hours.    tiotropium (SPIRIVA) 18 mcg inhalation capsule Inhale 1 capsule (18 mcg total) into the lungs once daily.    valsartan-hydrochlorothiazide (DIOVAN-HCT) 320-12.5 mg per tablet TAKE ONE TABLET BY MOUTH EVERY DAY    acyclovir (ZOVIRAX) 800 MG Tab Take 1 tablet (800 mg total) by mouth 4 (four) times daily.    diphenhydrAMINE (BENADRYL) 25 mg capsule Take 1 each (25 mg total) by mouth every 6 (six) hours as needed for Itching or Allergies.    docusate calcium (SURFAK) 240 mg capsule Take 240 mg by mouth 2 (two) times daily.    inhalation device (E-Z SPACER) Use as directed for inhalation.    methylPREDNISolone (MEDROL DOSEPACK) 4 mg tablet     multivitamin (THERAGRAN) per tablet Take 1 tablet by mouth once daily.    VENTOLIN HFA 90 mcg/actuation inhaler INHALE 2 PUFFS BY MOUTH EVERY 6 HOURS AS NEEDED FOR WHEEZING           Clinical Reference Information           Your Vitals Were     BP Pulse Height Weight SpO2 BMI    106/66 90 5' 5" (1.651 m) 61.7 kg (136 lb) 96% 22.63 kg/m2      Blood " Pressure          Most Recent Value    BP  106/66      Allergies as of 4/26/2017     Levaquin [Levofloxacin]    Penicillins    Sulfa (Sulfonamide Antibiotics)    Tramadol      Immunizations Administered on Date of Encounter - 4/26/2017     Name Date Dose VIS Date Route    Pneumococcal Conjugate - 13 Valent  Incomplete 0.5 mL 11/5/2015 Intramuscular      Orders Placed During Today's Visit      Normal Orders This Visit    Pneumococcal Conjugate Vaccine (13 Valent) (IM)     Future Labs/Procedures Expected by Expires    Mammo Digital Screening Bilateral With CAD  4/26/2017 6/26/2018      [facility] Sign-Up     Activating your [facility] account is as easy as 1-2-3!     1) Visit [email or web address], select Sign Up Now, enter this activation code and your date of birth, then select Next.  [email or web address]  Expires: 5/23/2017  4:28 PM      2) Create a username and password to use when you visit [facility] in the future and select a security question in case you lose your password and select Next.    3) Enter your e-mail address and click Sign Up!    Additional Information  If you have questions, please e-mail [email or web address] or call [phone] to talk to our [facility] staff. Remember, [facility] is NOT to be used for urgent needs. For medical emergencies, dial 911.         Language Assistance Services     ATTENTION: Language assistance services are available, free of charge. Please call [phone].      ATENCIÓN: Si habla español, tiene a ramires disposición servicios gratuitos de asistencia lingüística. Llame al [phone].     CHÚ Ý: N?u b?n nói Ti?ng Vi?t, có các d?ch v? h? tr? ngôn ng? mi?n phí dành cho b?n. G?i s? [phone].         [facility] complies with applicable Federal civil rights laws and does not discriminate on the basis of race, color, national origin, age, disability, or sex.

## 2017-04-26 NOTE — PROGRESS NOTES
Subjective:       Patient ID: Waleska Weaver is a 71 y.o. female.    Chief Complaint: Hospital Follow Up ([facility]-broken femur. Had surgery on March 16)    HPI Comments: Here today for hospital follow up. Fell and fractured her left hip and femur on 3/15/17. Dr. Lockhart did surgery at [facility] on 3/16/17. Pt reports she had a very eventful hospital stay, however I have no records to review even though they have been requested twice. While in hospital, pulmonologist and cardiologist had been consulted. She was then sent to [facility] for 20 day for rehab. She was discharged on 3L of nc oxygen and told to follow up with specialist as scheduled. (Dr. Lockhart 5/10, Dr. Urbano 5/17 and Dr. Martínez 5/18). Since her Discharge she has presented to [facility] twice 4/8/17 and 4/19/17. She has home PT through [facility].     Past Medical History:  No date: Arthritis      Comment: in back  No date: Asthma  No date: Atrial fibrillation  No date: Back pain  No date: Cataracts, bilateral  No date: Hypertension  No date: Hypoglycemia  No date: PONV (postoperative nausea and vomiting)      Comment: difficulty with pain meds  No date: Right shoulder pain    Past Surgical History:  No date: CATARACT EXTRACTION W/  INTRAOCULAR LENS IMPLA*      Comment: Bilateral  No date: COLONOSCOPY  No date: TONSILLECTOMY    Review of patient's family history indicates:    Heart disease                  Father                      Comment: MI    Early death                    Father                    Hypertension                   Father                    Hypertension                   Mother                    Arthritis                      Mother                    Cataracts                      Mother                    Anesthesia problems            Neg Hx                    Clotting disorder              Neg Hx                    Amblyopia                      Neg Hx                    Blindness                      Neg Hx                     Cancer                         Neg Hx                    Diabetes                       Neg Hx                    Glaucoma                       Neg Hx                    Macular degeneration           Neg Hx                    Retinal detachment             Neg Hx                    Strabismus                     Neg Hx                    Stroke                         Neg Hx                    Thyroid disease                Neg Hx                      Social History    Marital status: Single              Spouse name:                       Years of education:                 Number of children: 0             Social History Main Topics    Smoking status: Former Smoker                                                                Packs/day: 1.00      Years: 45.00          Quit date: 3/15/2017    Smokeless status: Never Used                        Comment: wearing nicotine patch    Alcohol use: Yes                Comment: social    Drug use: No              Current Outpatient Prescriptions:  acetaminophen (TYLENOL) 325 MG tablet, Take 325 mg by mouth every 6 (six) hours as needed for Pain. 2 tablets every 6 hours prn pain/increase temp, Disp: , Rfl:   albuterol (PROVENTIL) 2.5 mg /3 mL (0.083 %) nebulizer solution, Take 2.5 mg by nebulization 4 (four) times daily. Rescue, Disp: , Rfl:   digoxin (LANOXIN) 125 mcg tablet, Take 125 mcg by mouth once daily., Disp: , Rfl:   diphenhydrAMINE (BENADRYL) 2 % cream, Apply topically 3 (three) times daily as needed for Itching., Disp: , Rfl:   docusate sodium (COLACE) 50 MG capsule, Take by mouth 2 (two) times daily., Disp: , Rfl:   ferrous sulfate 220 mg (44 mg iron)/5 mL solution, Take 220 mg by mouth once daily., Disp: , Rfl:   fluticasone-salmeterol 500-50 mcg/dose (ADVAIR DISKUS) 500-50 mcg/dose DsDv diskus inhaler, Inhale 1 puff into the lungs 2 (two) times daily., Disp: 3 Inhaler, Rfl: 4  gabapentin (NEURONTIN) 300 MG capsule, Take 1 capsule (300 mg total) by mouth 3  (three) times daily., Disp: 90 capsule, Rfl: 11  hydrocodone-acetaminophen 5-325mg (NORCO) 5-325 mg per tablet, 1 tablet every 6 (six) hours as needed. , Disp: , Rfl:   nicotine (NICODERM CQ) 21 mg/24 hr, Place 1 patch onto the skin every 72 hours., Disp: , Rfl:   tiotropium (SPIRIVA) 18 mcg inhalation capsule, Inhale 1 capsule (18 mcg total) into the lungs once daily., Disp: 30 capsule, Rfl: 6  valsartan-hydrochlorothiazide (DIOVAN-HCT) 320-12.5 mg per tablet, TAKE ONE TABLET BY MOUTH EVERY DAY, Disp: 90 tablet, Rfl: 3  acyclovir (ZOVIRAX) 800 MG Tab, Take 1 tablet (800 mg total) by mouth 4 (four) times daily. (Patient taking differently: Take 800 mg by mouth nightly as needed. ), Disp: 40 tablet, Rfl: 0  diphenhydrAMINE (BENADRYL) 25 mg capsule, Take 1 each (25 mg total) by mouth every 6 (six) hours as needed for Itching or Allergies., Disp: 20 capsule, Rfl: 0  docusate calcium (SURFAK) 240 mg capsule, Take 240 mg by mouth 2 (two) times daily., Disp: , Rfl:   inhalation device (E-Z SPACER), Use as directed for inhalation., Disp: 1 Device, Rfl: 0  methylPREDNISolone (MEDROL DOSEPACK) 4 mg tablet, , Disp: , Rfl:   multivitamin (THERAGRAN) per tablet, Take 1 tablet by mouth once daily., Disp: , Rfl:   VENTOLIN HFA 90 mcg/actuation inhaler, INHALE 2 PUFFS BY MOUTH EVERY 6 HOURS AS NEEDED FOR WHEEZING, Disp: 18 g, Rfl: 3    No current facility-administered medications for this visit.     Review of patient's allergies indicates:   -- Levaquin (levofloxacin) -- Itching   -- Penicillins -- Swelling    --  Tongue and lip swelling   -- Sulfa (sulfonamide antibiotics) -- Swelling    --  Tongue and lip swelling   -- Tramadol       Review of Systems   Constitutional: Positive for activity change, appetite change and fatigue. Negative for fever.   Respiratory: Positive for shortness of breath (currently on 3 L nc). Negative for cough, chest tightness and wheezing.    Cardiovascular: Negative for chest pain, palpitations and leg  swelling.   Gastrointestinal: Negative.    Genitourinary: Negative.    Neurological: Positive for weakness. Negative for dizziness.       Objective:      Physical Exam   Constitutional: She is oriented to person, place, and time. She appears well-nourished. She is cooperative. No distress.   HENT:   Head: Normocephalic and atraumatic.   Right Ear: External ear normal.   Left Ear: External ear normal.   Nose: Nose normal.   Mouth/Throat: Oropharynx is clear and moist. No oropharyngeal exudate.   Eyes: Conjunctivae and EOM are normal. Pupils are equal, round, and reactive to light.   Neck: Normal range of motion. Neck supple.   Cardiovascular: Normal rate, regular rhythm, normal heart sounds and intact distal pulses.    Pulmonary/Chest: Effort normal and breath sounds normal. She has no wheezes. She has no rales.   Abdominal: Soft. Bowel sounds are normal. There is no tenderness.   Lymphadenopathy:     She has no cervical adenopathy.   Neurological: She is alert and oriented to person, place, and time.   Skin: Skin is warm and dry. No rash noted.   Psychiatric: She has a normal mood and affect. Her behavior is normal. Judgment and thought content normal.   Vitals reviewed.      Assessment:       1. Preventative health care    2. Encounter for screening mammogram for malignant neoplasm of breast     3. Essential hypertension    4. Tobacco abuse    5. Chronic obstructive pulmonary disease, unspecified COPD type    6. Atrial fibrillation, unspecified type    7. Thoracic aorta atherosclerosis    8. Alcohol abuse    9. S/p left hip fracture        Plan:       Waleska was seen today for hospital follow up.    Diagnoses and all orders for this visit:    Preventative health care  -     Pneumococcal Conjugate Vaccine (13 Valent) (IM)  -     Mammo Digital Screening Bilateral With CAD; Future    Encounter for screening mammogram for malignant neoplasm of breast   -     Mammo Digital Screening Bilateral With CAD;  Future    Essential hypertension  Stable- continue current medications and cardiology follow up    Tobacco abuse  Offered smoking cessation program. Pt declined at this time    Chronic obstructive pulmonary disease, unspecified COPD type  Stable- continue current medications and pulmonology follow up    Atrial fibrillation, unspecified type  Stable- continue current medications and cardiology follow up    Thoracic aorta atherosclerosis  Stable- continue current medications and cardiology follow up    Alcohol abuse  Stable    S/p left hip fracture  Continue PT Home Health  Follow up with orthopedics as scheduled.

## 2017-04-30 PROBLEM — I70.0 THORACIC AORTA ATHEROSCLEROSIS: Status: ACTIVE | Noted: 2017-01-01

## 2017-04-30 PROBLEM — Z87.81 S/P LEFT HIP FRACTURE: Status: ACTIVE | Noted: 2017-01-01

## 2017-04-30 PROBLEM — F10.10 ALCOHOL ABUSE: Status: ACTIVE | Noted: 2017-01-01

## 2017-04-30 PROBLEM — I48.91 ATRIAL FIBRILLATION: Status: ACTIVE | Noted: 2017-01-01

## 2017-07-13 NOTE — PROGRESS NOTES
Subjective:       Patient ID: Waleska Weaver is a 71 y.o. female.    Chief Complaint: Chest Congestion; Nasal Congestion; and Cough    Cough   This is a new problem. The current episode started in the past 7 days. The problem has been unchanged. The problem occurs constantly. The cough is productive of sputum. Associated symptoms include headaches, nasal congestion, postnasal drip, rhinorrhea and wheezing. Pertinent negatives include no chest pain, chills, ear congestion, ear pain, fever, heartburn, hemoptysis, myalgias, rash, sore throat, shortness of breath, sweats or weight loss. Treatments tried: mucinex.     Review of Systems   Constitutional: Negative for chills, fever and weight loss.   HENT: Positive for postnasal drip and rhinorrhea. Negative for ear pain and sore throat.    Respiratory: Positive for cough and wheezing. Negative for hemoptysis and shortness of breath.    Cardiovascular: Negative for chest pain.   Gastrointestinal: Negative for heartburn.   Musculoskeletal: Negative for myalgias.   Skin: Negative for rash.   Neurological: Positive for headaches.       Objective:      Physical Exam   Constitutional: She is oriented to person, place, and time. She appears well-nourished.   HENT:   Head: Normocephalic and atraumatic.   Right Ear: Hearing, tympanic membrane, external ear and ear canal normal.   Left Ear: Hearing, tympanic membrane, external ear and ear canal normal.   Nose: Nose normal.   Mouth/Throat: Oropharynx is clear and moist and mucous membranes are normal. No oropharyngeal exudate, posterior oropharyngeal edema or posterior oropharyngeal erythema.   Eyes: Conjunctivae and EOM are normal. Pupils are equal, round, and reactive to light.   Neck: Normal range of motion. Neck supple.   Cardiovascular: Normal rate, regular rhythm, normal heart sounds and intact distal pulses.    Pulmonary/Chest: Effort normal. She has wheezes (diffuse bilaterally). She has no rales.   Abdominal: Soft. Bowel  sounds are normal. There is no tenderness.   Lymphadenopathy:     She has no cervical adenopathy.   Neurological: She is alert and oriented to person, place, and time.   Skin: Skin is warm and dry. No rash noted.   Vitals reviewed.      Assessment:       1. COPD exacerbation    2. Tobacco abuse    3. Essential hypertension        Plan:       Waleska was seen today for chest congestion, nasal congestion and cough.    Diagnoses and all orders for this visit:    COPD exacerbation  -     albuterol (VENTOLIN HFA) 90 mcg/actuation inhaler; Inhale 2 puffs into the lungs every 6 (six) hours as needed. Rescue  -     doxycycline (VIBRAMYCIN) 100 MG Cap; Take 1 capsule (100 mg total) by mouth 2 (two) times daily.  -     predniSONE (DELTASONE) 20 MG tablet; Take 3 pills daily for 3 days, then 2 pills daily for 3 days, then 1 pill daily for 3 days, then 1/2 pill daily for 4 days.  -     fluticasone (FLONASE) 50 mcg/actuation nasal spray; 2 sprays by Each Nare route once daily.    Tobacco abuse  Smoking cessation discussed    Essential hypertension  Continue current treatment and routine follow ups

## 2017-07-19 NOTE — PROGRESS NOTES
Subjective:    Patient ID:  Waleska Weaver is a 71 y.o. female who presents for evaluation of Atrial Fibrillation      HPI  Admitted to [facility] last march with hip fracture  Apparently had episode of atrial fibrillation. Echocardiogram normal EF, JAMEEL normal  Angiogram normal  Placed on multaq, no anticoagulation  Doing well, except for extreme tiredness, fatigue    Review of Systems   Constitution: Negative for decreased appetite, weakness, malaise/fatigue, weight gain and weight loss.   Cardiovascular: Negative for chest pain, dyspnea on exertion, leg swelling, palpitations and syncope.   Respiratory: Negative for cough and shortness of breath.    Gastrointestinal: Negative.    All other systems reviewed and are negative.       Objective:    Physical Exam   Constitutional: She is oriented to person, place, and time. She appears well-developed and well-nourished.   HENT:   Head: Normocephalic.   Eyes: Pupils are equal, round, and reactive to light.   Neck: Normal range of motion. Neck supple. No JVD present. Carotid bruit is not present. No thyromegaly present.   Cardiovascular: Normal rate, regular rhythm, normal heart sounds, intact distal pulses and normal pulses.  PMI is not displaced.  Exam reveals no gallop.    No murmur heard.  Pulmonary/Chest: Effort normal and breath sounds normal.   Abdominal: Soft. Normal appearance. She exhibits no mass. There is no hepatosplenomegaly. There is no tenderness.   Musculoskeletal: Normal range of motion. She exhibits no edema.   Neurological: She is alert and oriented to person, place, and time. She has normal strength and normal reflexes. No sensory deficit.   Skin: Skin is warm and intact.   Psychiatric: She has a normal mood and affect.   Nursing note and vitals reviewed.    Records from [facility] reviewed      Assessment:       1. Atrial fibrillation, unspecified type    2. Essential hypertension         Plan:   Stop multaq  Stop diovan  Resume diovan/hct  Continue all other  cardiac medications  3 m f/u with holter

## 2017-07-20 NOTE — TELEPHONE ENCOUNTER
----- Message from Nicole uYsuf sent at 7/20/2017  3:14 PM CDT -----  Contact: Chela with [facility] with [facility] calling regarding order bone density scan. Chela states they are trying to get I completed by 09/11/17.  Please call Chela at [phone] ref # [id number]. Thanks!

## 2017-07-20 NOTE — TELEPHONE ENCOUNTER
Order in per [facility].    Called Chela with [facility], she is partnering with primary care providers and is wanting to know if orders were in for dexa scan. Advised I placed it in per our WOG and she verbally understood. I advised I will call pt and schedule and she verbally understood.      Called pt, no answer left voice mail to return call.

## 2017-07-21 NOTE — TELEPHONE ENCOUNTER
Called patient and stated to her that we were calling yesterday to schedule bone density scan. Pt states that she will call back and schedule this. Pt also requesting a refill on Hydocodone-acetaminophen. States she has been getting this prescription from Dr. Machado and she would rather get this prescription from . States  will not perform surgery on her because of her age, and the fact that she has smoked her whole life. Pt aware  is out of the clinic until 7/31. Rx pended. Please advise. Thanks. LOV: 7/13/17

## 2017-07-21 NOTE — TELEPHONE ENCOUNTER
----- Message from Bipin Ojeda sent at 7/21/2017  8:40 AM CDT -----  Contact: pt  Pt is returning call, call placed to pod no answer  Call Back#[phone]  Thanks

## 2017-07-21 NOTE — TELEPHONE ENCOUNTER
----- Message from Sunita Tena sent at 7/20/2017  4:24 PM CDT -----  Contact: self  Patient missed a call from Debra   Please return the call to    Thanks

## 2017-08-01 NOTE — TELEPHONE ENCOUNTER
I am unable to take over prescribing for another physician regarding controlled medications.  Can discuss at her next appointment

## 2017-08-02 NOTE — TELEPHONE ENCOUNTER
Called pt, notified of Dr. Medley message and she verbally understood. She stated Dr. Machado gave her another rx so she will just leave it and call back for appt.

## 2017-08-03 NOTE — Clinical Note
Danielito Medley MD,  I saw your patient today in the [facility].  If you have any questions, please do not hesitate to contact me.  Thank you!  VICKY Romo

## 2017-08-03 NOTE — TELEPHONE ENCOUNTER
(see refill encounter 7/20/17)      Called Chela with [facility], advised that pt stated she will call back to schedule it and hasn't yet. She verbally understood.

## 2017-08-03 NOTE — PROGRESS NOTES
"Waleska Weaver is a 71 y.o. female patient of Danielito Medley MD who presents to the clinic today for   Chief Complaint   Patient presents with    Cough    Sinus Problem     drainage   .    HPI    Pt, who is not known to me, reports a new problem to me:  Chest congestion, coughing, exac of her COPD, nasal congestion and drip.  Unable to cough up the phlegm in her throat.  On a nose spray.  Not coughing up any sputum.   Using her treatments/inhalers but ran out of Advair     Also having abd pain in the night.  Low in abd, intermittent.  Right side was burning this morning, like "a bad gas", never had that before.  No diarrhea.  Has some constipation.      Seen at [place] and treated for afib with d/c of BP med then seen here by Dr. Elizabeth who told her she doesn't have afib and put her back on her BP med.  Was having low BP at that time, as well.  BP was high when she saw Dr. Elizabeth but has been more normal or low since resuming the diovan/HCTZ.    These symptoms began 1+ weeks  ago and status is unchagned.     Symptoms are + acute exac of chronic COPD.    Pt denies the following symptoms:  fever    Aggravating factors include lying down .    Relieving factors include nothing .    OTC Medications tried are mucinex (ran out yesterday)--used it over a week w/o improvement..    Prescription medications taken for symptoms are spray/treatments.    Pertinent medical history:  COPD, HTN.    Smoking status:  continues    ROS    Constitutional:   No  fever, + fatigue.    Head:   No headache  Ears:   No pain.  Eyes:  No sxs  Nose:   No sinus pain, + congestion, + runny nose, + post nasal drip.  Throat:  Mild ST pain.    Heart:  No palpitations, chest pain.    Lungs:  No difficulty breathing, + coughing, no sputum production, + wheezing.              Symptoms are community acquired.  No known sick contacts.    GI/:  Low abd pain, no urine sxs.  Does have intermittent trouble with constipation.  Uses colace for this.    MS:  " No new bone, joint or muscle problems.    Skin:  No rashes, itching.      PAST MEDICAL HISTORY:  Past Medical History:   Diagnosis Date    Arthritis     in back    Asthma     Atrial fibrillation     Back pain     Cataracts, bilateral     Hypertension     Hypoglycemia     PONV (postoperative nausea and vomiting)     difficulty with pain meds    Right shoulder pain        PAST SURGICAL HISTORY:  Past Surgical History:   Procedure Laterality Date    CATARACT EXTRACTION W/  INTRAOCULAR LENS IMPLANT      Bilateral    COLONOSCOPY      TONSILLECTOMY         SOCIAL HISTORY:  Social History     Social History    Marital status: Single     Spouse name: N/A    Number of children: 0    Years of education: N/A     Occupational History    Not on file.     Social History Main Topics    Smoking status: Current Every Day Smoker     Packs/day: 1.00     Years: 45.00    Smokeless tobacco: Never Used      Comment: wearing nicotine patch    Alcohol use Yes      Comment: social    Drug use: No    Sexual activity: Not on file     Other Topics Concern    Not on file     Social History Narrative    No narrative on file       FAMILY HISTORY:  Family History   Problem Relation Age of Onset    Heart disease Father 40     MI    Early death Father     Hypertension Father     Hypertension Mother     Arthritis Mother     Cataracts Mother     Anesthesia problems Neg Hx     Clotting disorder Neg Hx     Amblyopia Neg Hx     Blindness Neg Hx     Cancer Neg Hx     Diabetes Neg Hx     Glaucoma Neg Hx     Macular degeneration Neg Hx     Retinal detachment Neg Hx     Strabismus Neg Hx     Stroke Neg Hx     Thyroid disease Neg Hx        ALLERGIES AND MEDICATIONS: updated and reviewed.  Review of patient's allergies indicates:   Allergen Reactions    Levaquin [levofloxacin] Itching    Penicillins Swelling     Tongue and lip swelling    Sulfa (sulfonamide antibiotics) Swelling     Tongue and lip swelling     Tramadol      Current Outpatient Prescriptions   Medication Sig Dispense Refill    acetaminophen (TYLENOL) 325 MG tablet Take 325 mg by mouth every 6 (six) hours as needed for Pain. 2 tablets every 6 hours prn pain/increase temp      albuterol (PROVENTIL) 2.5 mg /3 mL (0.083 %) nebulizer solution Take 2.5 mg by nebulization 4 (four) times daily. Rescue      albuterol (VENTOLIN HFA) 90 mcg/actuation inhaler Inhale 2 puffs into the lungs every 6 (six) hours as needed. Rescue 18 g 3    azelastine (ASTELIN) 137 mcg (0.1 %) nasal spray 1 spray by Nasal route 2 (two) times daily.      diphenhydrAMINE (BENADRYL) 2 % cream Apply topically 3 (three) times daily as needed for Itching.      docusate calcium (SURFAK) 240 mg capsule Take 240 mg by mouth 2 (two) times daily.      docusate sodium (COLACE) 50 MG capsule Take by mouth 2 (two) times daily.      ferrous sulfate 220 mg (44 mg iron)/5 mL solution Take 220 mg by mouth once daily.      fluticasone (FLONASE) 50 mcg/actuation nasal spray 2 sprays by Each Nare route once daily. 16 g 11    fluticasone-salmeterol 500-50 mcg/dose (ADVAIR DISKUS) 500-50 mcg/dose DsDv diskus inhaler Inhale 1 puff into the lungs 2 (two) times daily. 3 Inhaler 4    gabapentin (NEURONTIN) 300 MG capsule Take 1 capsule (300 mg total) by mouth 3 (three) times daily. (Patient taking differently: Take 600 mg by mouth 3 (three) times daily. ) 90 capsule 11    hydrocodone-acetaminophen 5-325mg (NORCO) 5-325 mg per tablet 1 tablet every 6 (six) hours as needed.       ibuprofen (ADVIL,MOTRIN) 200 MG tablet Take 200 mg by mouth every 6 (six) hours as needed for Pain.      lidocaine-prilocaine (EMLA) cream       multivitamin (THERAGRAN) per tablet Take 1 tablet by mouth once daily.      omeprazole (PRILOSEC) 20 MG capsule Take 20 mg by mouth once daily.      SPIRIVA WITH HANDIHALER 18 mcg inhalation capsule INHALE CONTENTS OF 1 CAPSULE USING HANDIHALER INTO THE LUNGS ONCE DAILY 30 capsule 11     valsartan (DIOVAN) 320 MG tablet       inhalation device (E-Z SPACER) Use as directed for inhalation. 1 Device 0     No current facility-administered medications for this visit.        PHYSICAL EXAM    Alert, coop 71 y.o. female patient in mild distress r/t sxs.    Vitals:    08/03/17 1238   BP: 94/64   Pulse: 96   Resp: 16   Temp: 98 °F (36.7 °C)     VS reviewed.  VS low BP for pt but asymptomatic.  CC, nursing note, medications & PMH all reviewed today.    Head:  Normocephalic, atraumatic.    EENT:  EACs patent.  TMs no erythema, normal LR, no effusions, no TM perforation.                              Eye lids normal, no discharge present.       Sinus tenderness to palp--none.               Nares--+ edema, no d/c present.    Pharynx not injected.                Tonsils not erythematous , not enlarged, no exudate present.    No anterior, no posterior cervical lymph nodes palpable.    No submental, submandibular or supraclavicular lymph nodes palp.             Resp:  Respirations even, unlabored, wearing O2, freq coughing   Lungs with bilat. insp/exp wheezing t/o, no crackles.  .    Heart:  RRR, no MRG.    Abd:  Neg CVAT.  BS+.  Abd soft, round, mildly tender low in abd to right of midline to palp.  No rebound or organomegaly.    MS:  Ambulates via  today.             NEURO:  Alert and oriented x 4.  Responds appropriately during interaction.    Skin:  Warm, dry, color good.    Wheezing  -     predniSONE (DELTASONE) 10 MG tablet; 4 tabs daily for 2 days, 3 tabs daily for 2 days, 2 tabs daily for 2 days then 1 tab daily for 2 days.  Dispense: 20 tablet; Refill: 0    Cough  -     predniSONE (DELTASONE) 10 MG tablet; 4 tabs daily for 2 days, 3 tabs daily for 2 days, 2 tabs daily for 2 days then 1 tab daily for 2 days.  Dispense: 20 tablet; Refill: 0    Lower abdominal pain  -     Urinalysis      Pt today presents with coughing, nonproductive and insp/exp wheezes.  Using nebs 3-4x daily but out of Advair.  Has  benefited from steroids in the past, tolerated them well.  Abd pain mild and no rebound or other concerning signs.    This is a new problem to me and the following work up is planned.    Lab & Radiological Tests Ordered: urinalysis.  The results are pending.    Pt advised to perform comfort measures recommended on patient instruction sheet .    If not better in 3 days, the patient is advised to call us.  If worse or concerns, the patient is advised to call us.  Explained exam findings, diagnosis and treatment plan to patient and her daughter, with her during the visit.  Questions answered and patient states understanding.

## 2017-08-03 NOTE — TELEPHONE ENCOUNTER
----- Message from Rocio Arcos sent at 8/3/2017  9:35 AM CDT -----  Contact: Chela from [employer]  Calling to follow up on scheduling bone density test. Please call [phone]. Reference Labmeeting ID [id number]. Thanks!

## 2017-08-04 NOTE — TELEPHONE ENCOUNTER
We called her for urinalysis results.  See urinalysis note:  indicates she needs to drink more water.  Thanks!

## 2017-08-04 NOTE — TELEPHONE ENCOUNTER
----- Message from Harshad Melendez sent at 8/4/2017  9:48 AM CDT -----  Contact: patient  Place call to pod, patient called regarding test results. Please call back at [phone] or [phone]. Thanks,

## 2017-10-05 NOTE — TELEPHONE ENCOUNTER
Patient here in lobby, [facility], needs refill on valsartan.  Per pharmacist, pt was told once labs were done her refill would be sent.  Labs done 10/12/17.  Pended for review.

## 2017-10-18 NOTE — TELEPHONE ENCOUNTER
Patient states regular Diovan was sent instead of valsartan with HCTZ.    [facility] states patient has not filled valsartan -12.5mg since     Patient states she will discuss with cardiologist tomorrow at [facility].

## 2017-10-18 NOTE — TELEPHONE ENCOUNTER
----- Message from Joanne Dubon sent at 10/18/2017 10:33 AM CDT -----  Contact: Patient  Patient is calling to inform the doctor that the wrong prescription was called in to be refilled.  Call Back#[phone]  Thanks

## 2017-10-19 NOTE — PROGRESS NOTES
Subjective:    Patient ID:  Waleska Weaver is a 72 y.o. female who presents for follow-up of paf    HPI  She comes with no complaints, no chest pain, no shortness of breath  BP rather low lately, no syncope    Review of Systems   Constitution: Negative for decreased appetite, weakness, malaise/fatigue, weight gain and weight loss.   Cardiovascular: Negative for chest pain, dyspnea on exertion, leg swelling, palpitations and syncope.   Respiratory: Negative for cough and shortness of breath.    Gastrointestinal: Negative.    All other systems reviewed and are negative.       Objective:    Physical Exam   Constitutional: She is oriented to person, place, and time. She appears well-developed and well-nourished.   HENT:   Head: Normocephalic.   Eyes: Pupils are equal, round, and reactive to light.   Neck: Normal range of motion. Neck supple. No JVD present. Carotid bruit is not present. No thyromegaly present.   Cardiovascular: Normal rate, regular rhythm, normal heart sounds, intact distal pulses and normal pulses.  PMI is not displaced.  Exam reveals no gallop.    No murmur heard.  Pulmonary/Chest: Effort normal and breath sounds normal.   Abdominal: Soft. Normal appearance. She exhibits no mass. There is no hepatosplenomegaly. There is no tenderness.   Musculoskeletal: Normal range of motion. She exhibits no edema.   Neurological: She is alert and oriented to person, place, and time. She has normal strength and normal reflexes. No sensory deficit.   Skin: Skin is warm and intact.   Psychiatric: She has a normal mood and affect.   Nursing note and vitals reviewed.        Assessment:       1. Paroxysmal atrial fibrillation    2. Essential hypertension    3. Tobacco abuse    4. Asthma-chronic obstructive pulmonary disease overlap syndrome         Plan:   Decrease diovan/hct to 160/12.5  Regular exercise program  3 m f/u

## 2017-10-31 NOTE — TELEPHONE ENCOUNTER
----- Message from Josy Brown sent at 10/31/2017  2:19 PM CDT -----  Contact: Patient  Contact patient for Holter monitor results at [phone].    Thank you

## 2017-11-30 PROBLEM — J40 BRONCHITIS: Status: ACTIVE | Noted: 2017-01-01

## 2017-11-30 PROBLEM — J44.1 COPD WITH ACUTE EXACERBATION: Status: ACTIVE | Noted: 2017-01-01

## 2018-01-01 ENCOUNTER — PES CALL (OUTPATIENT)
Dept: ADMINISTRATIVE | Facility: CLINIC | Age: 73
End: 2018-01-01

## 2018-01-01 ENCOUNTER — TELEPHONE (OUTPATIENT)
Dept: FAMILY MEDICINE | Facility: CLINIC | Age: 73
End: 2018-01-01

## 2018-01-01 ENCOUNTER — TELEPHONE (OUTPATIENT)
Dept: NEUROSURGERY | Facility: CLINIC | Age: 73
End: 2018-01-01

## 2018-01-01 ENCOUNTER — TELEPHONE (OUTPATIENT)
Dept: CARDIOLOGY | Facility: CLINIC | Age: 73
End: 2018-01-01

## 2018-01-01 ENCOUNTER — OFFICE VISIT (OUTPATIENT)
Dept: CARDIOLOGY | Facility: CLINIC | Age: 73
End: 2018-01-01
Payer: MEDICARE

## 2018-01-01 ENCOUNTER — CLINICAL SUPPORT (OUTPATIENT)
Dept: CARDIOLOGY | Facility: CLINIC | Age: 73
End: 2018-01-01
Attending: INTERNAL MEDICINE
Payer: MEDICARE

## 2018-01-01 VITALS
HEIGHT: 65 IN | BODY MASS INDEX: 26.3 KG/M2 | WEIGHT: 157.88 LBS | HEART RATE: 99 BPM | SYSTOLIC BLOOD PRESSURE: 132 MMHG | DIASTOLIC BLOOD PRESSURE: 84 MMHG

## 2018-01-01 DIAGNOSIS — R60.9 EDEMA, UNSPECIFIED TYPE: ICD-10-CM

## 2018-01-01 DIAGNOSIS — R60.9 EDEMA, UNSPECIFIED TYPE: Primary | ICD-10-CM

## 2018-01-01 DIAGNOSIS — I35.0 NONRHEUMATIC AORTIC VALVE STENOSIS: ICD-10-CM

## 2018-01-01 DIAGNOSIS — I10 ESSENTIAL HYPERTENSION: ICD-10-CM

## 2018-01-01 DIAGNOSIS — I48.0 PAROXYSMAL ATRIAL FIBRILLATION: ICD-10-CM

## 2018-01-01 DIAGNOSIS — Z72.0 TOBACCO ABUSE: ICD-10-CM

## 2018-01-01 LAB
AORTIC VALVE STENOSIS: ABNORMAL
DIASTOLIC DYSFUNCTION: NO
ESTIMATED PA SYSTOLIC PRESSURE: 34.83
MITRAL VALVE MOBILITY: NORMAL
RETIRED EF AND QEF - SEE NOTES: 50 (ref 55–65)
TRICUSPID VALVE REGURGITATION: ABNORMAL

## 2018-01-01 PROCEDURE — 99499 UNLISTED E&M SERVICE: CPT | Mod: S$GLB,,, | Performed by: INTERNAL MEDICINE

## 2018-01-01 PROCEDURE — 99214 OFFICE O/P EST MOD 30 MIN: CPT | Mod: S$GLB,,, | Performed by: INTERNAL MEDICINE

## 2018-01-01 PROCEDURE — 93306 TTE W/DOPPLER COMPLETE: CPT | Mod: S$GLB,,, | Performed by: INTERNAL MEDICINE

## 2018-01-01 PROCEDURE — 99999 PR PBB SHADOW E&M-EST. PATIENT-LVL II: CPT | Mod: PBBFAC,,, | Performed by: INTERNAL MEDICINE

## 2018-01-01 RX ORDER — ALBUTEROL SULFATE 0.83 MG/ML
SOLUTION RESPIRATORY (INHALATION)
Qty: 300 ML | Refills: 0 | Status: ON HOLD | OUTPATIENT
Start: 2018-01-01 | End: 2018-01-01 | Stop reason: HOSPADM

## 2018-01-01 RX ORDER — FUROSEMIDE 20 MG/1
20 TABLET ORAL DAILY
Qty: 30 TABLET | Refills: 11 | Status: SHIPPED | OUTPATIENT
Start: 2018-01-01 | End: 2019-01-19

## 2018-01-19 NOTE — PROGRESS NOTES
Subjective:    Patient ID:  Waleska Weaver is a 72 y.o. female who presents for follow-up of hypertension    HPI  She comes with leg swelling for the last 2-3 weeks    Review of Systems   Constitution: Negative for decreased appetite, weakness, malaise/fatigue, weight gain and weight loss.   Cardiovascular: Negative for chest pain, dyspnea on exertion, leg swelling, palpitations and syncope.   Respiratory: Negative for cough and shortness of breath.    Gastrointestinal: Negative.    All other systems reviewed and are negative.       Objective:    Physical Exam   Constitutional: She is oriented to person, place, and time. She appears well-developed and well-nourished.   HENT:   Head: Normocephalic.   Eyes: Pupils are equal, round, and reactive to light.   Neck: Normal range of motion. Neck supple. No JVD present. Carotid bruit is not present. No thyromegaly present.   Cardiovascular: Normal rate, regular rhythm, normal heart sounds, intact distal pulses and normal pulses.  PMI is not displaced.  Exam reveals no gallop.    No murmur heard.  Pulmonary/Chest: Effort normal and breath sounds normal.   Abdominal: Soft. Normal appearance. She exhibits no mass. There is no hepatosplenomegaly. There is no tenderness.   Musculoskeletal: Normal range of motion. She exhibits edema (2+ bilateral).   Neurological: She is alert and oriented to person, place, and time. She has normal strength and normal reflexes. No sensory deficit.   Skin: Skin is warm and intact.   Psychiatric: She has a normal mood and affect.   Nursing note and vitals reviewed.        Assessment:       1. Edema, unspecified type    2. Paroxysmal atrial fibrillation    3. Essential hypertension    4. Tobacco abuse         Plan:   Lasix 20 qd  Continue all cardiac medications  Regular exercise program  4 week f/u with [facility]

## 2018-02-01 NOTE — TELEPHONE ENCOUNTER
----- Message from Fawad Tello sent at 2/1/2018 11:24 AM CST -----  Contact: patient  Waleska, [phone], calling to find out results of Echo that was performed 1/25/18. Please advise. Thanks.

## 2018-02-02 PROBLEM — R06.02 SOB (SHORTNESS OF BREATH): Status: ACTIVE | Noted: 2018-01-01

## 2018-02-02 PROBLEM — E44.0 MODERATE PROTEIN-CALORIE MALNUTRITION: Status: ACTIVE | Noted: 2018-01-01

## 2018-02-02 PROBLEM — J96.01 ACUTE HYPOXEMIC RESPIRATORY FAILURE: Status: ACTIVE | Noted: 2018-01-01

## 2018-02-02 PROBLEM — E87.8 ELECTROLYTE ABNORMALITY: Status: ACTIVE | Noted: 2018-01-01

## 2018-02-03 PROBLEM — E87.1 HYPONATREMIA: Status: ACTIVE | Noted: 2018-01-01

## 2018-02-05 PROBLEM — J44.1 ACUTE EXACERBATION OF CHRONIC OBSTRUCTIVE PULMONARY DISEASE (COPD): Status: ACTIVE | Noted: 2018-01-01

## 2018-02-05 PROBLEM — I10 PRIMARY HYPERTENSION: Status: ACTIVE | Noted: 2018-01-01

## 2018-02-06 NOTE — TELEPHONE ENCOUNTER
----- Message from Kaylie Del Toro sent at 2/6/2018 10:54 AM CST -----  Please call patient in regards to scheduling a [facility] 1m f/u DC:02/06/18 for respiratory failure, [phone] (home)

## 2018-02-07 NOTE — TELEPHONE ENCOUNTER
----- Message from RT Rodo sent at 2/7/2018 10:33 AM CST -----  Contact: pt    Called pod,pt , returned missed call and would like to discuss her appt, thanks.

## 2018-02-08 NOTE — TELEPHONE ENCOUNTER
----- Message from Nunu Sam sent at 2/8/2018  8:36 AM CST -----  Contact: Cande [facility]#[phone]  Cande with [facility]#[phone], patient refused home health on yesterday when cande tried to admit her and patient went back in the hospital last night, [facility].

## 2018-02-09 PROBLEM — D63.8 ANEMIA, CHRONIC DISEASE: Status: ACTIVE | Noted: 2018-01-01

## 2018-02-12 PROBLEM — M79.89 SWELLING OF BOTH LOWER EXTREMITIES: Status: ACTIVE | Noted: 2018-01-01

## 2018-02-13 PROBLEM — R60.0 PERIPHERAL EDEMA: Status: ACTIVE | Noted: 2018-01-01

## 2018-02-14 PROBLEM — F32.A DEPRESSION: Status: ACTIVE | Noted: 2018-01-01

## 2018-02-26 PROBLEM — K52.9 COLITIS, ACUTE: Status: ACTIVE | Noted: 2018-01-01

## 2018-02-26 PROBLEM — J96.20 ACUTE ON CHRONIC RESPIRATORY FAILURE: Status: ACTIVE | Noted: 2018-01-01

## 2018-03-01 PROBLEM — R41.0 DELIRIUM: Status: ACTIVE | Noted: 2018-01-01

## 2018-03-02 PROBLEM — E63.9 INADEQUATE DIETARY ENERGY INTAKE: Status: ACTIVE | Noted: 2018-01-01

## 2018-03-02 PROBLEM — I48.0 PAROXYSMAL ATRIAL FIBRILLATION WITH RVR: Status: ACTIVE | Noted: 2018-01-01

## 2018-03-02 PROBLEM — K63.1 LARGE BOWEL PERFORATION: Status: ACTIVE | Noted: 2018-01-01

## 2018-03-02 PROBLEM — I67.1 BRAIN ANEURYSM: Status: ACTIVE | Noted: 2018-01-01

## 2018-03-03 PROBLEM — I48.91 ATRIAL FIBRILLATION WITH RVR: Status: ACTIVE | Noted: 2018-01-01

## 2018-03-03 PROBLEM — E63.9 INADEQUATE DIETARY ENERGY INTAKE: Status: ACTIVE | Noted: 2018-01-01

## 2018-03-05 NOTE — TELEPHONE ENCOUNTER
----- Message from Jovana Arguello PA-C sent at 3/5/2018  9:12 AM CST -----  Pt with chronic aneurysm, but intubated at this time inpatient. Will need clinic appt in 1 month with Dr. peguero when medically cleared for surgery. Has all imging in system

## 2018-03-05 NOTE — TELEPHONE ENCOUNTER
Patient currently admitted. Will receive info regarding follow up appointment with Dr. Bradley upon discharge.

## 2018-03-07 PROBLEM — J96.01 ACUTE HYPOXEMIC RESPIRATORY FAILURE: Status: RESOLVED | Noted: 2018-01-01 | Resolved: 2018-01-01

## 2018-03-07 PROBLEM — R41.0 DELIRIUM: Status: RESOLVED | Noted: 2018-01-01 | Resolved: 2018-01-01

## 2018-03-07 PROBLEM — I48.0 PAROXYSMAL ATRIAL FIBRILLATION WITH RVR: Status: RESOLVED | Noted: 2018-01-01 | Resolved: 2018-01-01

## 2018-03-07 PROBLEM — J98.11 ATELECTASIS: Status: ACTIVE | Noted: 2018-01-01

## 2018-03-08 PROBLEM — R73.9 HYPERGLYCEMIA: Status: ACTIVE | Noted: 2018-01-01

## 2018-03-08 PROBLEM — M79.89 SWELLING OF BOTH LOWER EXTREMITIES: Status: RESOLVED | Noted: 2018-01-01 | Resolved: 2018-01-01

## 2018-03-09 PROBLEM — R23.9 ALTERATION IN SKIN INTEGRITY RELATED TO SURGICAL INCISION: Status: ACTIVE | Noted: 2018-01-01

## 2018-03-09 PROBLEM — R73.9 HYPERGLYCEMIA: Status: ACTIVE | Noted: 2018-01-01

## 2018-03-09 PROBLEM — E87.1 HYPONATREMIA: Status: RESOLVED | Noted: 2018-01-01 | Resolved: 2018-01-01

## 2018-03-12 PROBLEM — Z51.5 COMFORT MEASURES ONLY STATUS: Status: ACTIVE | Noted: 2018-01-01

## 2018-03-13 PROBLEM — J98.11 ATELECTASIS: Status: RESOLVED | Noted: 2018-01-01 | Resolved: 2018-01-01

## 2020-02-09 NOTE — Clinical Note
Danielito Medley MD,  I saw your patient today in the [facility].  If you have any questions, please do not hesitate to contact me or Dr. Manrique.  Thank you!  VICKY Romo 
Abdominal Pain, N/V/D

## 2021-01-01 NOTE — PATIENT INSTRUCTIONS
For the wheezing and coughing:  Refill your Advair.  Use treatments every 4 hrs until improved.  Continue nose spray.  Sleep on 3 pillows.  Fluids--increase water.  Prednisone    For the abdominal pain:  Colace stool softener over the counter  Urine test--we'll call with the results.  Call if not better in a couple of days or if worse.    If you have any questions, please call.  You can reach us at [phone] Monday through Friday (except holidays) 12 nooon to 6 p.m.    Thank you for using the [facility]!    ALEX Romo, CNP, FNP-BC  [facility]  [facility]       General


Date of Service:  2021


Day of Life:  2


Weight (G):  2624 (-76 g)





History


This is a baby premature female, born at 34 -4/7 weeks of gestational age via 

spontaneous vaginal delivery at [facility] early on the morning of 

 to a 27 -year-old  (G) 4 para (P) now 3  mother, who is blood type 

A+, hepatitis B negative, rapid plasma reagin (RPR) , HIV negative, group B 

Streptococcus (GBS) unknown . Mother was noncompliant with prenatal care and 

prenatal care was limited. Mother reportedly tested positive for amphetamines 

Rupture of membranes approximately 24 minutes prior to delivery with clear fluid

and then terminal meconium. Baby's Apgar scores at birth were 8 at one minute 

and 9 at five minutes. The child was transferred from [facility] to 

[facility] by the [facility] NICU transport team who 

requested that the child be admitted at [facility] rather than 

being taken to [facility].





Vital Signs/I&O


Vital Signs





Vital Signs








  Date Time  Temp Pulse Resp B/P (MAP) Pulse Ox O2 Delivery O2 Flow Rate FiO2


 


21 09:00 96.4       


 


21 09:00  162 68 85/39 (54) 99 Room Air  








Intake and Output











I & O 


 


 21





 06:00


 


Intake Total 290.5 ml


 


Output Total 275 ml


 


Balance 15.5 ml


 


 


 


Intake Oral 70 ml


 


IV Total 220.5 ml


 


Output Urine Total 275 ml


 


# Incontinent Voids 8


 


# Bowel Movements 3


 


# Emeses 0








Urine Output (Average mL/kg/hr:  4


Bowel Movements:  1





Physical Examination


Respiratory:  Positive: Good Bilateral Air Entry


Cardiac:  Positive: S1, S2; 


   Negative: Murmur


Metobolic/Abdominal:  Positive Soft


Neurological:  Positive: Good Tone


Extremities:  Positive: Full ROM Times 4


Skin:  Positive: Normal for Gestation





Laboratory Data


CBC/BMP/Bili





Laboratory Tests








Test


 21


07:52


 


Total Bilirubin


 5.9 MG/DL


(2.00-12.00)











Feedings


What:  Formula





Other Medical Treatments


IV fluids D10W at 80 ML per KG per day





Problems


Problems:  


(1) Premature infant of 34 weeks gestation


Assessment & Plan:  1. Baby was born at 34+ week gestation.


2. Baby is currently tolerating feeds of 10 ML and on IV fluids D10W at 80 ML 

per KG per day.


3. Start to increase feeds by 2 ML every 12 hours, follow intake and tolerance





(2) Observation and evaluation of  for suspected infectious condition


Assessment & Plan:  1. Due to  labor the possibility of sepsis in the ne

wborn must be considered.


2. Blood cultures negative to date.


3. Baby did not receive antibiotics.


4. Follow blood culture closely








Current Medications





Current Medications








 Medications


  (Trade)  Dose


 Ordered  Sig/Lizeth


 Route


 PRN Reason  Start Time


 Stop Time Status Last Admin


Dose Admin


 


 Dextrose  1,000 ml @ 


 9 mls/hr  Q24H


 IV


   21 08:45


    21 08:20




















VÍCTOR GRADY DO                2021 10:53